# Patient Record
Sex: MALE | Race: WHITE | NOT HISPANIC OR LATINO | Employment: PART TIME | URBAN - METROPOLITAN AREA
[De-identification: names, ages, dates, MRNs, and addresses within clinical notes are randomized per-mention and may not be internally consistent; named-entity substitution may affect disease eponyms.]

---

## 2017-10-06 ENCOUNTER — OFFICE VISIT (OUTPATIENT)
Dept: BEHAVIORAL HEALTH | Facility: PHYSICIAN GROUP | Age: 27
End: 2017-10-06
Payer: COMMERCIAL

## 2017-10-06 DIAGNOSIS — F42.9 OBSESSIVE-COMPULSIVE DISORDER, UNSPECIFIED TYPE: ICD-10-CM

## 2017-10-06 PROCEDURE — 90791 PSYCH DIAGNOSTIC EVALUATION: CPT | Performed by: MARRIAGE & FAMILY THERAPIST

## 2017-10-06 NOTE — BH THERAPY
[facility]  INITIAL ASSESSMENT    Name: Alessandro Wilks  MRN: [id number]  : 1990  Age: 27 y.o.  Date of assessment: 10/6/2017  PCP: DENNIS Rendon.  Persons in attendance: Patient  Total session time: 45 minutes      CHIEF COMPLAINT AND HISTORY OF PRESENTING PROBLEM:  (as stated by Patient):  Alessandro Wilks is a 27 y.o., White male referred for assessment by No ref. provider found.  Primary presenting issue includes   Chief Complaint   Patient presents with   • Anxiety   . ocd    FAMILY/SOCIAL HISTORY  Current living situation/household members: lives w/ wife of 6 yrs, good reln  Relevant family history/structure/dynamics: raised by bioparents, fa alcoholic; parents  3 yrs now; he's oldest, 2 younger sists, 1younger bro  Current family/social stressors: none noted  Quality/quantity of current family and/or social support: wife and friends, close to mom  Does patient/parent report a family history of behavioral health issues, diagnoses, or treatment? Yes  Family History   Problem Relation Age of Onset   • Alcohol abuse Father    • Heart Disease Maternal Grandfather    • Diabetes Neg Hx         BEHAVIORAL HEALTH TREATMENT HISTORY  Does patient/parent report a history of prior behavioral health treatment for patient? No:    History of untreated behavioral health issues identified? No    MEDICAL HISTORY  Primary care behavioral health screenings: Patient Health Questionaire                                     If depressive symptoms identified deferred to follow up visit unless specifically addressed in assesment and plan.    Interpretation of PHQ-9 Total Score   Score Severity   1-4 No Depression   5-9 Mild Depression   10-14 Moderate Depression   15-19 Moderately Severe Depression   20-27 Severe Depression       Past medical/surgical history:   No past medical history on file.   No past surgical history on file.     Medication Allergies:  Lactose   Medical history provided by  patient during current evaluation: none noted     Patient reports last physical exam: October 2017  Does patient/parent report any history of or current developmental concerns? No  Does patient/parent report nutritional concerns? No  Does patient/parent report change in appetite or weight loss/gain? No  Does patient/parent report history of eating disorder symptoms? No  Does patient/parent report dental problem? No  Does patient/parent report physical pain? No   Indicate if pain is acute or chronic, and location: na   Pain scale rating:       Does patient/parent report functional impact of medical, developmental, or pain issues?   N\A    EDUCATIONAL/LEARNING HISTORY  Is patient currently enrolled in a school/educational program?   No:   Highest grade level completed: MA in English  School performance/functioning: good  History of Special Education/repeated grades/learning issues: no  Preferred learning style: all  Current learning needs (large print, language barrier, etc):  na      EMPLOYMENT/RESOURCES  Is the patient currently employed? 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 Yes most of the time (wife is )  Does patient identify impact of presenting issue on work functioning? No  Work or income-related stressors:  varies     HISTORY:  Does patient report current or past enlistment? No    [If yes, complete below items]  Does patient report history of exposure to combat? No  Does patient report history of  sexual trauma? No  Does patient report other -related stressors? No    SPIRITUAL/CULTURAL/IDENTITY:  What are the patient’s/family’s spiritual beliefs or practices? Latter-day  What is the patient’s cultural or ethnic background/identity? cauc  How does the patient identify their sexual orientation? het  How does the  patient identify their gender? male  Does the patient identify any spiritual/cultural/identity factors as relevant to the presenting issue? No    LEGAL HISTORY  Has the patient ever been involved with juvenile, adult, or family legal systems? No   [If yes, trigger section below:]  Does patient report ever being a victim of a crime?  No  Does patient report involvement in any current legal issues?  No  Does patient report ever being arrested or committing a crime? No  Does patient report any current agency (parole/probation/CPS/) involvement? No    ABUSE/NEGLECT/TRAUMA SCREENING  Does patient report feeling “unsafe” in his/her home, or afraid of anyone? No  Does patient report any history of physical, sexual, or emotional abuse? No  Does parent or significant other report any of the above? na  Is there evidence of neglect by self? No  Is there evidence of neglect by a caregiver? No  Does the patient/parent report any history of CPS/APS/police involvement related to suspected abuse/neglect or domestic violence? No  Does the patient/parent report any other history of potentially traumatic life events? No  Based on the information provided during the current assessment, is a mandated report of suspected abuse/neglect being made?  No     SAFETY ASSESSMENT - SELF  Does patient acknowledge current or past symptoms of dangerousness to self? No  Does parent/significant other report patient has current or past symptoms of dangerousness to self? na      Recent change in frequency/specificity/intensity of suicidal thoughts or self-harm behavior? No  Current access to firearms, medications, or other identified means of suicide/self-harm? No  If yes, willing to restrict access to means of suicide/self-harm? na  Protective factors present: Future-oriented, Good impulse control, Positive self-efficacy and Strong family connections    Current Suicide Risk: Low  Crisis Safety Plan completed and copy given to patient:  No    SAFETY ASSESSMENT - OTHERS  Does paor past symptoms of aggressive behavior or risk to others? No  Does parent/significant othtient acknowledge current or past symptoms of aggressive behavior or risk to others? na  Does parent/significant other report patient has current or past symptoms of aggressive behavior or risk to others? na    Recent change in frequency/specificity/intensity of thoughts or threats to harm others? No  Current access to firearms/other identified means of harm? No  If yes, willing to restrict access to weapons/means of harm? na  Protective factors present: Good frustration tolerance, Well-developed sense of empathy, Positive impulse-control and Stable relationships    Current Homicide Risk:  Not applicable  Crisis Safety Plan completed and copy given to patient? No  Based on information provided during the current assessment, is a mandated “duty to warn” being exercised? No    SUBSTANCE USE/ADDICTION HISTORY  [] Not applicable - patient 10 years of age or younger    Is there a family history of substance use/addiction? Yes  Does patient acknowledge or parent/significant other report use of/dependence on substances? Yes  Last time patient used alcohol: yest  Within the past week? Yes  Last time patient used marijuana: na  Within the past month? No  Any other street drugs ever tried even once? No  Any use of prescription medications/pills without a prescription, or for reasons others than originally prescribed?  No  Any other addictive behavior reported (gambling, shopping, sex)? No     Drug History:  Amphetamine:Amphetamine frequency: Never used      Cannibis:  Cannabis frequency: Never used      Cocaine:  Cocaine frequency: Never used      Ecstasy:  Ecstasy frequency: Never used      Hallucinogen:  Hallucinogen frequency: Never used      Inhalant:   Inhalant frequency: Never used      Opiate:  Opiate frequency: Never used  Cannabis frequency: Never used      Other:  Other drug frequency:  Never used      Sedative:   Sedative frequency: Never used          What consequences does the patient associate with any of the above substance use and or addictive behaviors? None    Patient’s motivation/readiness for change: says is ok w/ amount/frequency of drinking    [] Patient denies use of any substance/addictive behaviors    STRENGTHS/ASSETS  Strengths Identified by interviewer: Insight into problems, Evidence of good judgement, Self-awareness, Problem-solving skills and Cognitive flexibility  Strengths Identified by patient: willingness to change    MENTAL STATUS/OBSERVATIONS   Participation: Active verbal participation and Open to feedback  Grooming: Neat  Orientation:Alert   Behavior: Calm  Eye contact: Good   Mood:Anxious  Affect:Anxious  Thought process: Logical  Thought content:  Within normal limits  Speech: Rate within normal limits  Perception: Within normal limits  Memory: No gross evidence of memory deficits  Insight: Good  Judgment:  Good  Other:    Family/couple interaction observations: na    RESULTS OF SCREENING MEASURES:  [] Not applicable  Measure:   Score:     Measure:   Score:       CLINICAL FORMULATION: 28 yo Jt here for help w/ OCD sx, primarily checking and cleaning (hand washing); says checking has always been an issue but worse since they moved here for grad school; checks burners, doors multiple times daily when he leaves; uncertain how many times or when it's okay to go; also washes hands over and over and also wipes himself in restroom too often; disc insecurity of job (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 Obsessive-compulsive disorder, unspecified type           IDENTIFIED NEEDS/PLAN:  [If any of these marked, trigger DISPOSITION list]  Mood/anxiety and Occupational/Educational  Actively being addressed by [facility]    Does patient express agreement with the above plan? Yes     Referral appointment(s) scheduled? w/ this therapist       MAKENNA Antunez.

## 2017-12-01 ENCOUNTER — OFFICE VISIT (OUTPATIENT)
Dept: MEDICAL GROUP | Facility: MEDICAL CENTER | Age: 27
End: 2017-12-01
Payer: COMMERCIAL

## 2017-12-01 VITALS
OXYGEN SATURATION: 97 % | WEIGHT: 155.42 LBS | HEART RATE: 74 BPM | RESPIRATION RATE: 16 BRPM | BODY MASS INDEX: 21.05 KG/M2 | DIASTOLIC BLOOD PRESSURE: 62 MMHG | SYSTOLIC BLOOD PRESSURE: 120 MMHG | HEIGHT: 72 IN | TEMPERATURE: 98.7 F

## 2017-12-01 DIAGNOSIS — K64.9 HEMORRHOIDS, UNSPECIFIED HEMORRHOID TYPE: ICD-10-CM

## 2017-12-01 DIAGNOSIS — K58.8 OTHER IRRITABLE BOWEL SYNDROME: ICD-10-CM

## 2017-12-01 DIAGNOSIS — Z23 INFLUENZA VACCINE NEEDED: ICD-10-CM

## 2017-12-01 DIAGNOSIS — F42.9 OBSESSIVE-COMPULSIVE DISORDER, UNSPECIFIED TYPE: ICD-10-CM

## 2017-12-01 PROCEDURE — 90471 IMMUNIZATION ADMIN: CPT | Performed by: NURSE PRACTITIONER

## 2017-12-01 PROCEDURE — 90686 IIV4 VACC NO PRSV 0.5 ML IM: CPT | Performed by: NURSE PRACTITIONER

## 2017-12-01 PROCEDURE — 99213 OFFICE O/P EST LOW 20 MIN: CPT | Mod: 25 | Performed by: NURSE PRACTITIONER

## 2017-12-01 ASSESSMENT — PATIENT HEALTH QUESTIONNAIRE - PHQ9: CLINICAL INTERPRETATION OF PHQ2 SCORE: 0

## 2017-12-01 ASSESSMENT — ENCOUNTER SYMPTOMS
NERVOUS/ANXIOUS: 1
DIARRHEA: 1

## 2017-12-01 NOTE — LETTER
[employer]  FARHAT RendonPLEBRON  [address]  [place] [address]  Fax: [phone]   Authorization for Release/Disclosure of   Protected Health Information   Name: ALESSANDRO LOPEZ : 1990 SSN: [id number]   Address: [facility] [place] [address] Phone:    [phone] (home)    I authorize the entity listed below to release/disclose the PHI below to:   [employer]/MORGAN Rendon and MORGAN Rendon   Provider or Entity Name:     Address   City, State, Zip   Phone:      Fax:     Reason for request: continuity of care   Information to be released:    [  ] LAST COLONOSCOPY,  including any PATH REPORT and follow-up  [  ] LAST FIT/COLOGUARD RESULT [  ] LAST DEXA  [  ] LAST MAMMOGRAM  [  ] LAST PAP  [  ] LAST LABS [  ] RETINA EXAM REPORT  [  ] IMMUNIZATION RECORDS  [  ] Release all info      [  ] Check here and initial the line next to each item to release ALL health information INCLUDING  _____ Care and treatment for drug and / or alcohol abuse  _____ HIV testing, infection status, or AIDS  _____ Genetic Testing    DATES OF SERVICE OR TIME PERIOD TO BE DISCLOSED: _____________  I understand and acknowledge that:  * This Authorization may be revoked at any time by you in writing, except if your health information has already been used or disclosed.  * Your health information that will be used or disclosed as a result of you signing this authorization could be re-disclosed by the recipient. If this occurs, your re-disclosed health information may no longer be protected by State or Federal laws.  * You may refuse to sign this Authorization. Your refusal will not affect your ability to obtain treatment.  * This Authorization becomes effective upon signing and will  on (date) __________.      If no date is indicated, this Authorization will  one (1) year from the signature date.    Name: Alessandro Lopez    Signature:   Date:     2017       PLEASE FAX  REQUESTED RECORDS BACK TO: [phone]

## 2017-12-01 NOTE — PROGRESS NOTES
Subjective:      Alessandro Wilks is a 27 y.o. male who presents with Annual Exam            HPI Alessandro Wilks Was here initially for her yearly physical but he has some issues with hemorrhoids and loose stools.    1. Hemorrhoids, unspecified hemorrhoid type  Patient was seen for his initial visit last year and had complained of hemorrhoids and an external hemorrhoid was seen. He was given a prescription for Anusol suppositories but it was too expensive to fill this. He states since then he still has recurring issues with his hemorrhoid. It does not happen on a regular basis but every month or so he will have some blood on the toilet paper and irritation in the rectal area. He denies change in size or shape of stools, change in color of stools, or regular rectal bleeding or black stools.    2. Other irritable bowel syndrome  Patient states he does have diarrhea periodically and it is usually related to stress. He is currently in counseling for this. He states when he is not working he has less episodes of loose stools then when he is working. He denies constipation or blood in the stool. This sometimes irritates his hemorrhoids. He also thinks he might be lactose intolerant.    3. Obsessive-compulsive disorder, unspecified type  Patient reports he is currently in counseling for this.    4. Influenza vaccine needed  Patient due for yearly vaccine.    Social History   Substance Use Topics   • Smoking status: Never Smoker   • Smokeless tobacco: Never Used   • Alcohol use 1.2 oz/week     2 Cans of beer per week      Comment: daily     Current Outpatient Prescriptions   Medication Sig Dispense Refill   • hydrocortisone rectal (PROCTOZONE HC) 2.5 % Cream BID PRN for up to 3 days 30 g 1   • hydrocortisone (ANUSOL-HC) 25 MG Suppos Insert 1 Suppository in rectum every 12 hours. 12 Suppository 0     No current facility-administered medications for this visit.      Family History   Problem Relation Age of Onset   •  "Alcohol abuse Father    • Heart Disease Maternal Grandfather    • Diabetes Neg Hx    History reviewed. No pertinent past medical history.    Review of Systems   Gastrointestinal: Positive for diarrhea.   Psychiatric/Behavioral: The patient is nervous/anxious.    All other systems reviewed and are negative.         Objective:     /62   Pulse 74   Temp 37.1 °C (98.7 °F)   Resp 16   Ht 1.82 m (5' 11.65\")   Wt 70.5 kg (155 lb 6.8 oz)   SpO2 97%   BMI 21.28 kg/m²      Physical Exam   Constitutional: He is oriented to person, place, and time. He appears well-developed and well-nourished. No distress.   HENT:   Head: Normocephalic and atraumatic.   Right Ear: External ear normal.   Left Ear: External ear normal.   Nose: Nose normal.   Mouth/Throat: Oropharynx is clear and moist.   Eyes: Conjunctivae are normal. Right eye exhibits no discharge. Left eye exhibits no discharge.   Neck: Normal range of motion. Neck supple. No tracheal deviation present. No thyromegaly present.   Cardiovascular: Normal rate, regular rhythm and normal heart sounds.    No murmur heard.  Pulmonary/Chest: Effort normal and breath sounds normal. No respiratory distress. He has no wheezes. He has no rales.   Genitourinary:   Genitourinary Comments: Rectal exam declined.   Lymphadenopathy:     He has no cervical adenopathy.   Neurological: He is alert and oriented to person, place, and time. Coordination normal.   Skin: Skin is warm and dry. No rash noted. He is not diaphoretic. No erythema.   Psychiatric: He has a normal mood and affect. His behavior is normal. Judgment and thought content normal.   Nursing note and vitals reviewed.              Assessment/Plan:     1. Hemorrhoids, unspecified hemorrhoid type  Patient declined rectal exam but his previous exam from last year showed external hemorrhoid. I discussed options with patient including being referred to a colorectal surgeon for gastroenterology to have the hemorrhoid removed but " he declined. He states if he has more issues he will contact me for referral. I will give him some Proctosol cream to use when it is irritated. The symptoms worsen he needs to go to GI.  - hydrocortisone rectal (PROCTOZONE HC) 2.5 % Cream; BID PRN for up to 3 days  Dispense: 30 g; Refill: 1    2. Other irritable bowel syndrome  Patient sounds like he might have irritable bowel syndrome because of recurring diarrhea when under stress. He is currently in counseling and I recommended a high-fiber diet and decreasing stress. Again, I spoke with him about going to gastroenterology and he thinks he will consider it.    3. Obsessive-compulsive disorder, unspecified type  Patient continues with mental health counseling.    4. Influenza vaccine needed  I have placed the below orders and discussed them with an approved delegating provider. The MA is performing the below orders under the direction of Dr. Callahan    - INFLUENZA VACCINE QUAD INJ >3Y(PF)

## 2017-12-01 NOTE — LETTER
[employer]  FARHAT RendonPLEBRON  [address]  [place] [address]  Fax: [phone]   Authorization for Release/Disclosure of   Protected Health Information   Name: ALESSANDRO LOPEZ : 1990 SSN: [id number]   Address: [facility] [place] [address] Phone:    [phone] (home)    I authorize the entity listed below to release/disclose the PHI below to:   [employer]/MORGAN Rendon and MORGAN Rendon   Provider or Entity Name:     Address   City, State, Zip   Phone:      Fax:     Reason for request: continuity of care   Information to be released:    [  ] LAST COLONOSCOPY,  including any PATH REPORT and follow-up  [  ] LAST FIT/COLOGUARD RESULT [  ] LAST DEXA  [  ] LAST MAMMOGRAM  [  ] LAST PAP  [  ] LAST LABS [  ] RETINA EXAM REPORT  [  ] IMMUNIZATION RECORDS  [  ] Release all info      [  ] Check here and initial the line next to each item to release ALL health information INCLUDING  _____ Care and treatment for drug and / or alcohol abuse  _____ HIV testing, infection status, or AIDS  _____ Genetic Testing    DATES OF SERVICE OR TIME PERIOD TO BE DISCLOSED: _____________  I understand and acknowledge that:  * This Authorization may be revoked at any time by you in writing, except if your health information has already been used or disclosed.  * Your health information that will be used or disclosed as a result of you signing this authorization could be re-disclosed by the recipient. If this occurs, your re-disclosed health information may no longer be protected by State or Federal laws.  * You may refuse to sign this Authorization. Your refusal will not affect your ability to obtain treatment.  * This Authorization becomes effective upon signing and will  on (date) __________.      If no date is indicated, this Authorization will  one (1) year from the signature date.    Name: Alessandro Lopez    Signature:   Date:     2017       PLEASE FAX  REQUESTED RECORDS BACK TO: [phone]

## 2017-12-13 ENCOUNTER — OFFICE VISIT (OUTPATIENT)
Dept: BEHAVIORAL HEALTH | Facility: PHYSICIAN GROUP | Age: 27
End: 2017-12-13
Payer: COMMERCIAL

## 2017-12-13 DIAGNOSIS — F42.9 OBSESSIVE-COMPULSIVE DISORDER, UNSPECIFIED TYPE: ICD-10-CM

## 2017-12-13 PROCEDURE — 90834 PSYTX W PT 45 MINUTES: CPT | Performed by: MARRIAGE & FAMILY THERAPIST

## 2018-01-26 ENCOUNTER — APPOINTMENT (OUTPATIENT)
Dept: BEHAVIORAL HEALTH | Facility: PHYSICIAN GROUP | Age: 28
End: 2018-01-26
Payer: COMMERCIAL

## 2018-02-09 ENCOUNTER — OFFICE VISIT (OUTPATIENT)
Dept: BEHAVIORAL HEALTH | Facility: PHYSICIAN GROUP | Age: 28
End: 2018-02-09
Payer: COMMERCIAL

## 2018-02-09 DIAGNOSIS — F42.9 OBSESSIVE-COMPULSIVE DISORDER, UNSPECIFIED TYPE: ICD-10-CM

## 2018-02-09 PROCEDURE — 90834 PSYTX W PT 45 MINUTES: CPT | Performed by: MARRIAGE & FAMILY THERAPIST

## 2018-02-09 NOTE — BH THERAPY
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; found he was paying less attn to checking and it increased so he had to watch himself to decrease it again; training for races; can use the training metaphor for checking bx; they are thinking of moving back to [place] and he can be a , which he says will be less stressful; asked about drinking w/ his family hx of alcoholism; will go w/out for a month and see how he feels, also this will probably decrease anxiety bx    Objective/Observations:   Participation: Active verbal participation   Grooming: Casual and Neat   Cognition: Alert   Eye contact: Good   Mood: Anxious   Affect: Anxious   Thought process: Logical   Speech: Rate within normal limits   Other:     Diagnoses:   1. Obsessive-compulsive disorder, unspecified type         Current risk:   SUICIDE: Low   Homicide: Not applicable   Self-harm: Not applicable   Relapse: Not applicable   Other:    Safety Plan reviewed? Not Indicated   If evidence of imminent risk is present, intervention/plan:     Therapeutic Intervention(s): Cognitive modification, Develop/modify treatment plan, Goal-setting, Positive behavior reinforced, Problem-solving and Self-care skills    Treatment Goal(s)/Objective(s) addressed: positive changes; pt using affirmations; will be working on not drinking and on reducing checking bx     Progress toward Treatment Goals: Moderate improvement    Plan: see above  - Next appointment scheduled:  Visit date not found    Marci Montaño  GENO  2/9/2018

## 2018-02-20 DIAGNOSIS — K64.9 HEMORRHOIDS, UNSPECIFIED HEMORRHOID TYPE: ICD-10-CM
